# Patient Record
Sex: MALE | Race: WHITE | NOT HISPANIC OR LATINO | Employment: FULL TIME | URBAN - METROPOLITAN AREA
[De-identification: names, ages, dates, MRNs, and addresses within clinical notes are randomized per-mention and may not be internally consistent; named-entity substitution may affect disease eponyms.]

---

## 2024-10-25 ENCOUNTER — OFFICE VISIT (OUTPATIENT)
Dept: UROLOGY | Facility: CLINIC | Age: 64
End: 2024-10-25
Attending: SPECIALIST
Payer: COMMERCIAL

## 2024-10-25 VITALS
BODY MASS INDEX: 37.19 KG/M2 | HEART RATE: 65 BPM | SYSTOLIC BLOOD PRESSURE: 138 MMHG | DIASTOLIC BLOOD PRESSURE: 80 MMHG | WEIGHT: 280.63 LBS | OXYGEN SATURATION: 94 % | HEIGHT: 73 IN

## 2024-10-25 DIAGNOSIS — N40.1 BPH ASSOCIATED WITH NOCTURIA: Primary | ICD-10-CM

## 2024-10-25 DIAGNOSIS — R35.1 BPH ASSOCIATED WITH NOCTURIA: Primary | ICD-10-CM

## 2024-10-25 PROCEDURE — 99999 PR PBB SHADOW E&M-NEW PATIENT-LVL III: CPT | Mod: PBBFAC,,, | Performed by: SPECIALIST

## 2024-10-25 RX ORDER — TERAZOSIN 10 MG/1
10 CAPSULE ORAL NIGHTLY
COMMUNITY

## 2024-10-25 RX ORDER — TAMSULOSIN HYDROCHLORIDE 0.4 MG/1
1 CAPSULE ORAL
COMMUNITY
Start: 2024-09-14 | End: 2024-10-25 | Stop reason: SDUPTHER

## 2024-10-25 RX ORDER — TAMSULOSIN HYDROCHLORIDE 0.4 MG/1
0.4 CAPSULE ORAL DAILY
Qty: 90 CAPSULE | Refills: 3 | Status: SHIPPED | OUTPATIENT
Start: 2024-10-25

## 2024-10-25 RX ORDER — LOVASTATIN 40 MG/1
40 TABLET ORAL NIGHTLY
COMMUNITY

## 2024-10-25 NOTE — PROGRESS NOTES
[facility] - Urology   Clinic Note    SUBJECTIVE:     Chief Complaint   Patient presents with    Urinary Tract Infection     Went to ER a fews ago for UTI and was placed on Flomax to help shrink the bladder. States he has been doing good on the Flomax. States he feels as though his bladder is always full but only urinates a little. States minimal pain today with urge to urinate       Referral from: Self, Aaareferral.    History of Present Illness:  Harjit An is a 63 y.o. male who presents to clinic for BPH..    Patient mentioned she went to the emergency department for concerns regarding acute urinary retention.  Fortunately his symptoms have significantly improved since being placed on tamsulosin 0.4 mg q.h.s..  His IP SS score is 24/35 and quality of life score 6/6.  I note that he has been on terazosin 10 mg daily without any relief of symptoms.  He denies a history of hypertension and I have encouraged him to discontinue this.  He is not on a 5 alpha reductase inhibitor.  His PSAs have been normal and stable at 0.7    Patient endorses no additional complaints at this time.    History reviewed. No pertinent past medical history.    History reviewed. No pertinent surgical history.    No family history on file.    Social History     Tobacco Use    Smoking status: Never    Smokeless tobacco: Never       Current Outpatient Medications on File Prior to Visit   Medication Sig Dispense Refill    lovastatin (MEVACOR) 40 MG tablet Take 40 mg by mouth every evening.      terazosin (HYTRIN) 10 MG capsule Take 10 mg by mouth every evening.      [DISCONTINUED] tamsulosin (FLOMAX) 0.4 mg Cap Take 1 capsule by mouth.       No current facility-administered medications on file prior to visit.       Review of patient's allergies indicates:   Allergen Reactions    Penicillins        Review of Systems   Constitutional:  Negative for chills and fever.   Genitourinary:  Positive for frequency and urgency. Negative for  "hematuria.        Review of Systems:  A review of 10+ systems was conducted with pertinent positive and negative findings documented in HPI with all other systems reviewed and negative.    OBJECTIVE:     Estimated body mass index is 37.03 kg/m² as calculated from the following:    Height as of this encounter: 6' 1" (1.854 m).    Weight as of this encounter: 127.3 kg (280 lb 10.3 oz).    Vital Signs (Most Recent)  Vitals:    10/25/24 0811   BP: 138/80   Pulse: 65       Physical Exam:    Physical Exam     GENERAL: patient sitting comfortably  HEENT: normocephalic  NECK: supple, no JVD  PULM: normal chest rise, no increased WOB  HEART: non-diaphoretic  ABDO: soft, nondistended, nontender  BACK: no CVA tenderness bilaterally  SKIN: warm, dry, well perfused  EXT: no bruising or edema  NEURO: grossly normal with no focal deficits  PSYCH: appropriate mood and affect    Genitourinary Exam:  JACQUELYN: normal sphincter tone, smooth, rubbery prostate, no nodules      LABS:     Lab Results   Component Value Date    BUN 13 10/04/2021    CREATININE 0.89 10/04/2021    GFRNONAA 87 10/04/2021    WBC 5.2 06/17/2024    HGB 14.9 06/17/2024    HCT 45.7 06/17/2024     06/17/2024    HGBA1C 6.2 (H) 06/17/2024        Urinalysis:   No results found for: "UAREFLEX"     PSA:  Lab Results   Component Value Date    PSATOTAL 0.7 06/17/2024    PSATOTAL 0.7 05/29/2023    PSATOTAL 0.7 04/21/2022       Testosterone:  No results found for: "TOTALTESTOST", "TESTOSTERONE"     Imaging:  I have personally reviewed all relevant imaging studies.    No results found for this or any previous visit (from the past 2160 hours).  No results found for this or any previous visit (from the past 2160 hours).  No image results found.         ASSESSMENT     1. BPH associated with nocturia        PLAN:     Continue tamsulosin 0.4 mg q.h.s.  Follow up annually for PSA and JACQUELYN      Francis Dyer MD  Urology  [facility]     Disclaimer: This note has been " generated using voice-recognition software. There may be typographical errors that have been missed during proof-reading.